# Patient Record
Sex: MALE | Race: WHITE | Employment: FULL TIME | URBAN - METROPOLITAN AREA
[De-identification: names, ages, dates, MRNs, and addresses within clinical notes are randomized per-mention and may not be internally consistent; named-entity substitution may affect disease eponyms.]

---

## 2024-09-18 PROBLEM — L73.9 FOLLICULITIS: Status: RESOLVED | Noted: 2023-09-23 | Resolved: 2024-09-18

## 2024-09-18 PROBLEM — R59.0 INGUINAL LYMPHADENOPATHY: Status: RESOLVED | Noted: 2023-09-23 | Resolved: 2024-09-18

## 2024-09-25 ENCOUNTER — OFFICE VISIT (OUTPATIENT)
Dept: PRIMARY CARE | Facility: CLINIC | Age: 29
End: 2024-09-25
Payer: COMMERCIAL

## 2024-09-25 VITALS
SYSTOLIC BLOOD PRESSURE: 132 MMHG | TEMPERATURE: 97.8 F | OXYGEN SATURATION: 99 % | WEIGHT: 206 LBS | HEART RATE: 97 BPM | HEIGHT: 70 IN | DIASTOLIC BLOOD PRESSURE: 80 MMHG | BODY MASS INDEX: 29.49 KG/M2

## 2024-09-25 DIAGNOSIS — E55.9 VITAMIN D DEFICIENCY: ICD-10-CM

## 2024-09-25 DIAGNOSIS — Z23 ENCOUNTER FOR IMMUNIZATION: ICD-10-CM

## 2024-09-25 DIAGNOSIS — R63.5 WEIGHT GAIN: ICD-10-CM

## 2024-09-25 DIAGNOSIS — Z00.00 ANNUAL PHYSICAL EXAM: Primary | ICD-10-CM

## 2024-09-25 DIAGNOSIS — E78.2 MIXED HYPERLIPIDEMIA: ICD-10-CM

## 2024-09-25 PROCEDURE — 99395 PREV VISIT EST AGE 18-39: CPT | Performed by: INTERNAL MEDICINE

## 2024-09-25 PROCEDURE — 1036F TOBACCO NON-USER: CPT | Performed by: INTERNAL MEDICINE

## 2024-09-25 PROCEDURE — 3008F BODY MASS INDEX DOCD: CPT | Performed by: INTERNAL MEDICINE

## 2024-09-25 PROCEDURE — 90471 IMMUNIZATION ADMIN: CPT | Performed by: INTERNAL MEDICINE

## 2024-09-25 PROCEDURE — 90656 IIV3 VACC NO PRSV 0.5 ML IM: CPT | Performed by: INTERNAL MEDICINE

## 2024-09-25 ASSESSMENT — PROMIS GLOBAL HEALTH SCALE
RATE_GENERAL_HEALTH: VERY GOOD
RATE_SOCIAL_SATISFACTION: VERY GOOD
CARRYOUT_PHYSICAL_ACTIVITIES: COMPLETELY
CARRYOUT_SOCIAL_ACTIVITIES: VERY GOOD
EMOTIONAL_PROBLEMS: SOMETIMES
RATE_AVERAGE_FATIGUE: MILD
RATE_PHYSICAL_HEALTH: GOOD
RATE_QUALITY_OF_LIFE: EXCELLENT
RATE_AVERAGE_PAIN: 0
RATE_MENTAL_HEALTH: VERY GOOD

## 2024-09-25 ASSESSMENT — PATIENT HEALTH QUESTIONNAIRE - PHQ9
SUM OF ALL RESPONSES TO PHQ9 QUESTIONS 1 AND 2: 0
1. LITTLE INTEREST OR PLEASURE IN DOING THINGS: NOT AT ALL
2. FEELING DOWN, DEPRESSED OR HOPELESS: NOT AT ALL

## 2024-09-25 ASSESSMENT — ENCOUNTER SYMPTOMS
DEPRESSION: 0
OCCASIONAL FEELINGS OF UNSTEADINESS: 0
LOSS OF SENSATION IN FEET: 0

## 2024-09-25 ASSESSMENT — PAIN SCALES - GENERAL: PAINLEVEL: 0-NO PAIN

## 2024-09-25 NOTE — PROGRESS NOTES
[facility]: [facility]  PROGRESS NOTE      Joseph Osorio is a 29 y.o. male that is being seen  today for Annual Exam.  Subjective   Patient is a 29-year-old male with a history of hyperlipidemia, vitamin D deficiency who is being seen for annual physical examination.  Patient has gained few pounds of weight from before.  Patient has not been watching his diet and doing significant physical exercises.  Denies any other complaints.  Patient is going to get a flu shot.      ROS  Negative for fever or chills  Negative for sore throat, ear pain, nasal discharge  Negative for cough, shortness of breath or wheezing  Negative for chest pain, palpitations, swelling of legs  Negative for abdominal pain, constipation, diarrhea, blood in the stools  Negative for urinary complaints  Negative for headache, dizziness, weakness or numbness  Negative for joint pain  Negative for depression or anxiety  All other systems reviewed and were negative   Vitals:    09/25/24 1259   BP: 132/80   Pulse: 97   Temp: 36.6 °C (97.8 °F)   SpO2: 99%      Vitals:    09/25/24 1259   Weight: 93.4 kg (206 lb)     Body mass index is 29.56 kg/m².  Physical Exam  Constitutional: Patient does not appear to be in any acute distress  Head and Face: NCAT  Eyes: Normal external exam, EOMI  ENT: Normal external inspection of ears and nose. Oropharynx normal.  Cardiovascular: RRR, S1/S2, no murmurs, rubs, or gallops, radial pulses +2, no edema of extremities  Pulmonary: CTAB, no respiratory distress.  Abdomen: +BS, soft, non-tender, nondistended, no guarding or rebound, no masses noted  MSK: No joint swelling, normal movements of all extremities. Range of motion- normal.  Skin- No lesions, contusions, or erythema.  Peripheral puslses palpable bilaterally 2+  Neuro: AAO X3, Cranial nerves 2-12 grossly intact,DTR 2+ in all 4 limbs   Psychiatric: Judgment intact. Appropriate mood and behavior    LABS   [unfilled]  Lab Results  "  Component Value Date    GLUCOSE 103 (H) 10/20/2023    CALCIUM 10.0 10/20/2023     10/20/2023    K 4.6 10/20/2023    CO2 27 10/20/2023     10/20/2023    BUN 13 10/20/2023    CREATININE 1.00 10/20/2023     Lab Results   Component Value Date    ALT 40 10/20/2023    AST 24 10/20/2023    ALKPHOS 76 10/20/2023    BILITOT 0.9 10/20/2023     Lab Results   Component Value Date    WBC 6.3 10/20/2023    HGB 14.6 10/20/2023    HCT 44.1 10/20/2023    MCV 81 10/20/2023     10/20/2023     Lab Results   Component Value Date    CHOL 219 (H) 10/20/2023     Lab Results   Component Value Date    HDL 53.0 10/20/2023     Lab Results   Component Value Date    LDLCALC 131 (H) 10/20/2023     Lab Results   Component Value Date    TRIG 175 (H) 10/20/2023     No results found for: \"HGBA1C\"  Other labs not included in the list above were reviewed either before or during this encounter.    History    Past Medical History:   Diagnosis Date    Anxiety     Folliculitis     Hair loss     Inguinal lymphadenopathy     Vitamin D deficiency      Past Surgical History:   Procedure Laterality Date    WISDOM TOOTH EXTRACTION       No family history on file.  No Known Allergies  No current outpatient medications on file prior to visit.     No current facility-administered medications on file prior to visit.     Immunization History   Administered Date(s) Administered    Flu vaccine (IIV4), preservative free *Check age/dose* 10/16/2019, 10/20/2021    Influenza, seasonal, injectable 09/23/2023    Pfizer Purple Cap SARS-CoV-2 04/04/2021, 04/25/2021, 02/11/2022, 09/16/2023    Tdap vaccine, age 7 year and older (BOOSTRIX, ADACEL) 07/21/2010, 10/03/2023     Patient's medical history was reviewed and updated either before or during this encounter.  ASSESSMENT / PLAN:  Diagnoses and all orders for this visit:  Annual physical exam  -     CBC; Future  -     Comprehensive Metabolic Panel; Future  Vitamin D deficiency  -     Vitamin D " 25-Hydroxy,Total (for eval of Vitamin D levels); Future  Mixed hyperlipidemia  -     Lipid Panel; Future  Weight gain  -     TSH with reflex to Free T4 if abnormal; Future  Encounter for immunization  Other orders  -     Flu vaccine, trivalent, preservative free, age 6 months and greater (Fluarix/Fluzone/Flulaval)    Seen for annual physical examination lab work ordered.  No new complaints.      Rustam Artis MD

## 2024-10-03 ENCOUNTER — LAB (OUTPATIENT)
Dept: LAB | Facility: LAB | Age: 29
End: 2024-10-03
Payer: COMMERCIAL

## 2024-10-03 DIAGNOSIS — E78.2 MIXED HYPERLIPIDEMIA: ICD-10-CM

## 2024-10-03 DIAGNOSIS — Z00.00 ANNUAL PHYSICAL EXAM: ICD-10-CM

## 2024-10-03 DIAGNOSIS — R63.5 WEIGHT GAIN: ICD-10-CM

## 2024-10-03 DIAGNOSIS — E55.9 VITAMIN D DEFICIENCY: ICD-10-CM

## 2024-10-03 LAB
25(OH)D3 SERPL-MCNC: 21 NG/ML (ref 30–100)
ALBUMIN SERPL BCP-MCNC: 4.6 G/DL (ref 3.4–5)
ALP SERPL-CCNC: 66 U/L (ref 33–120)
ALT SERPL W P-5'-P-CCNC: 42 U/L (ref 10–52)
ANION GAP SERPL CALCULATED.3IONS-SCNC: 10 MMOL/L (ref 10–20)
AST SERPL W P-5'-P-CCNC: 22 U/L (ref 9–39)
BILIRUB SERPL-MCNC: 0.7 MG/DL (ref 0–1.2)
BUN SERPL-MCNC: 16 MG/DL (ref 6–23)
CALCIUM SERPL-MCNC: 9.5 MG/DL (ref 8.6–10.3)
CHLORIDE SERPL-SCNC: 104 MMOL/L (ref 98–107)
CHOLEST SERPL-MCNC: 214 MG/DL (ref 0–199)
CHOLEST/HDLC SERPL: 3.8 {RATIO}
CO2 SERPL-SCNC: 30 MMOL/L (ref 21–32)
CREAT SERPL-MCNC: 1.01 MG/DL (ref 0.5–1.3)
EGFRCR SERPLBLD CKD-EPI 2021: >90 ML/MIN/1.73M*2
ERYTHROCYTE [DISTWIDTH] IN BLOOD BY AUTOMATED COUNT: 13.1 % (ref 11.5–14.5)
GLUCOSE SERPL-MCNC: 96 MG/DL (ref 74–99)
HCT VFR BLD AUTO: 43.4 % (ref 41–52)
HDLC SERPL-MCNC: 55.6 MG/DL
HGB BLD-MCNC: 14.1 G/DL (ref 13.5–17.5)
LDLC SERPL CALC-MCNC: 134 MG/DL
MCH RBC QN AUTO: 26.6 PG (ref 26–34)
MCHC RBC AUTO-ENTMCNC: 32.5 G/DL (ref 32–36)
MCV RBC AUTO: 82 FL (ref 80–100)
NON HDL CHOLESTEROL: 158 MG/DL (ref 0–149)
NRBC BLD-RTO: 0 /100 WBCS (ref 0–0)
PLATELET # BLD AUTO: 215 X10*3/UL (ref 150–450)
POTASSIUM SERPL-SCNC: 4.2 MMOL/L (ref 3.5–5.3)
PROT SERPL-MCNC: 7.2 G/DL (ref 6.4–8.2)
RBC # BLD AUTO: 5.3 X10*6/UL (ref 4.5–5.9)
SODIUM SERPL-SCNC: 140 MMOL/L (ref 136–145)
TRIGL SERPL-MCNC: 123 MG/DL (ref 0–149)
TSH SERPL-ACNC: 3.87 MIU/L (ref 0.44–3.98)
VLDL: 25 MG/DL (ref 0–40)
WBC # BLD AUTO: 6.7 X10*3/UL (ref 4.4–11.3)

## 2024-10-03 PROCEDURE — 82306 VITAMIN D 25 HYDROXY: CPT

## 2024-10-03 PROCEDURE — 85027 COMPLETE CBC AUTOMATED: CPT

## 2024-10-03 PROCEDURE — 36415 COLL VENOUS BLD VENIPUNCTURE: CPT

## 2024-10-03 PROCEDURE — 84443 ASSAY THYROID STIM HORMONE: CPT

## 2024-10-03 PROCEDURE — 80053 COMPREHEN METABOLIC PANEL: CPT

## 2024-10-03 PROCEDURE — 80061 LIPID PANEL: CPT

## 2024-10-04 ENCOUNTER — TELEPHONE (OUTPATIENT)
Dept: PRIMARY CARE | Facility: CLINIC | Age: 29
End: 2024-10-04
Payer: COMMERCIAL

## 2024-10-04 RX ORDER — ACETAMINOPHEN 500 MG
5000 TABLET ORAL DAILY
COMMUNITY

## 2024-10-04 NOTE — TELEPHONE ENCOUNTER
----- Message from Rustam Artis sent at 10/4/2024 10:05 AM EDT -----  Patient has low vitamin D and elevated cholesterol.  Patient needs to watch fat in his diet.  Need to take vitamin D3 5000 units daily.